# Patient Record
Sex: MALE | Race: WHITE | NOT HISPANIC OR LATINO | Employment: UNEMPLOYED | URBAN - METROPOLITAN AREA
[De-identification: names, ages, dates, MRNs, and addresses within clinical notes are randomized per-mention and may not be internally consistent; named-entity substitution may affect disease eponyms.]

---

## 2023-04-27 ENCOUNTER — OFFICE VISIT (OUTPATIENT)
Dept: INTERNAL MEDICINE | Facility: CLINIC | Age: 38
End: 2023-04-27
Payer: COMMERCIAL

## 2023-04-27 VITALS
HEIGHT: 70 IN | WEIGHT: 134 LBS | DIASTOLIC BLOOD PRESSURE: 70 MMHG | SYSTOLIC BLOOD PRESSURE: 104 MMHG | OXYGEN SATURATION: 99 % | HEART RATE: 80 BPM | BODY MASS INDEX: 19.18 KG/M2 | TEMPERATURE: 99 F

## 2023-04-27 DIAGNOSIS — Z91.89 ENCOUNTER FOR HEPATITIS C VIRUS SCREENING TEST FOR HIGH RISK PATIENT: ICD-10-CM

## 2023-04-27 DIAGNOSIS — Z11.59 ENCOUNTER FOR HEPATITIS C VIRUS SCREENING TEST FOR HIGH RISK PATIENT: ICD-10-CM

## 2023-04-27 DIAGNOSIS — Z13.0 SCREENING FOR DEFICIENCY ANEMIA: ICD-10-CM

## 2023-04-27 DIAGNOSIS — F95.2 TOURETTE'S: ICD-10-CM

## 2023-04-27 DIAGNOSIS — Z13.21 ENCOUNTER FOR VITAMIN DEFICIENCY SCREENING: ICD-10-CM

## 2023-04-27 DIAGNOSIS — N40.0 ENLARGED PROSTATE: ICD-10-CM

## 2023-04-27 DIAGNOSIS — F90.9 ATTENTION DEFICIT HYPERACTIVITY DISORDER (ADHD), UNSPECIFIED ADHD TYPE: ICD-10-CM

## 2023-04-27 DIAGNOSIS — F19.11 HX OF SUBSTANCE ABUSE: ICD-10-CM

## 2023-04-27 DIAGNOSIS — Z76.89 ENCOUNTER TO ESTABLISH CARE: Primary | ICD-10-CM

## 2023-04-27 DIAGNOSIS — Z12.5 SPECIAL SCREENING FOR MALIGNANT NEOPLASM OF PROSTATE: ICD-10-CM

## 2023-04-27 DIAGNOSIS — G25.81 RLS (RESTLESS LEGS SYNDROME): ICD-10-CM

## 2023-04-27 DIAGNOSIS — Z72.0 NICOTINE VAPOR PRODUCT USER: ICD-10-CM

## 2023-04-27 DIAGNOSIS — Z13.220 LIPID SCREENING: ICD-10-CM

## 2023-04-27 DIAGNOSIS — Z13.29 SCREENING FOR ENDOCRINE DISORDER: ICD-10-CM

## 2023-04-27 RX ORDER — NICOTINE 21 MG/24HR
1 PATCH, TRANSDERMAL 24 HOURS TRANSDERMAL EVERY 24 HOURS
Qty: 14 PATCH | Refills: 2 | Status: SHIPPED | OUTPATIENT
Start: 2023-04-27

## 2023-04-27 RX ORDER — GABAPENTIN 300 MG/1
1 CAPSULE ORAL 3 TIMES DAILY
COMMUNITY
Start: 2023-04-20

## 2023-04-27 RX ORDER — BUPRENORPHINE AND NALOXONE 8; 2 MG/1; MG/1
1.75 FILM, SOLUBLE BUCCAL; SUBLINGUAL DAILY
COMMUNITY

## 2023-04-27 NOTE — PROGRESS NOTES
Office Note     Name: Bryan Kirkpatrick    : 1985     MRN: [id number]   Care Team: Patient Care Team:  Indigo Lizama APRN as PCP - General (Family Medicine)  Provider, No Known as PCP - Family Medicine    Chief Complaint  Establish Care    Subjective     History of Present Illness:  Bryan Kirkpatrick is a 37 y.o. male who presents today to establish care.    Mr. Kirkpatrick has a medical history of exercise-induced asthma, Tourette's, ADHD, restless leg syndrome, and history of substance abuse.    Mr. Kirkpatrick describes his health as overall well.  He has always had to eat a significant amount of calories to maintain his weight.  He eats large meals and drinks protein shakes.  He does not exercise.    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  He is engaged to his fiancée who he has been with for 6 years.  He lives in a home in [facility] [place].    Mr. Kirkpatrick began his drug use in the seventh grade, injecting heroin by ninth grade, and this continued for 19 years.  He stopped all drug use at the age of 31.  Around 3-4 months ago, his father passed and he got placed on Suboxone due to his mental health at the time.  He is still currently on Suboxone however, he feels his mental health is in a good place and is planning to stop taking it soon.    He has a history of cigarette smoking however he quit smoking 7 years ago as well.  He has been using a nicotine vape.  With the loss of his father's vape use increased significantly.  He has been using a nicotine patch which is helpful however he feels he has tried to decrease the dose too quickly.  He is requesting nicotine patch at 21 mg.    Mr. Kirkpatrick reports a history of exercise-induced asthma, mostly as a kid.  Although he does not currently exercise, he feels he can get short of breath at times.  Describing this as an inability to fully expand his lungs.  States that it happens randomly.    He complains of  "occasional constipation.  States that this is due to the Suboxone.  He does not take anything for it, states that it somewhat resolves on its own.    Mr. Kirkpatrick is in school for behavioral therapy which he utilizes techniques to manage anxiety and depression.  States that he is aware of triggers from previous trauma and uses his perception and behavior strategies to cope.  States that he is \"happy.\"  Expresses a great deal of pride in where he has come over the past 7 years and all that he is accomplished.    He also states that there are several areas on his lower extremities where the hair is thinning over the past several years.  He also notes this on the crown of his head and a small patch on his scrotum.    Review of Systems   Respiratory: Positive for shortness of breath.    Gastrointestinal: Positive for constipation.   Psychiatric/Behavioral: Negative for suicidal ideas. The patient is hyperactive.    All other systems reviewed and are negative.      Past Medical History:   Diagnosis Date   • Headache    • Tourette disorder    • Tourette syndrome        Past Surgical History:   Procedure Laterality Date   • MOUTH SURGERY         Social History     Socioeconomic History   • Marital status: Single   Tobacco Use   • Smoking status: Former     Packs/day: 1.00     Years: 15.00     Pack years: 15.00     Types: Cigarettes     Quit date:      Years since quittin.3   • Smokeless tobacco: Never   Vaping Use   • Vaping Use: Every day   • Start date: 2016   • Substances: Nicotine, Flavoring   • Devices: Pre-filled or refillable cartridge, Refillable tank   Substance and Sexual Activity   • Alcohol use: No   • Drug use: Not Currently     Types: Heroin     Comment: patient denies, pinpoint pupils  Quit 17         Current Outpatient Medications:   •  buprenorphine-naloxone (Suboxone) 8-2 MG film film, Place 1.75 films under the tongue Daily., Disp: , Rfl:   •  gabapentin (NEURONTIN) 300 MG " "capsule, Take 1 capsule by mouth 3 (Three) Times a Day., Disp: , Rfl:   •  nicotine (NICODERM CQ) 21 MG/24HR patch, Place 1 patch on the skin as directed by provider Daily., Disp: 14 patch, Rfl: 2    Procedures    PHQ-9 Total Score:      Objective     Vital Signs  /70 (BP Location: Left arm, Patient Position: Sitting, Cuff Size: Adult)   Pulse 80   Temp 99 °F (37.2 °C) (Temporal)   Ht 179 cm (70.47\")   Wt 60.8 kg (134 lb)   SpO2 99%   BMI 18.97 kg/m²   Estimated body mass index is 18.97 kg/m² as calculated from the following:    Height as of this encounter: 179 cm (70.47\").    Weight as of this encounter: 60.8 kg (134 lb).    BMI is within normal parameters. No other follow-up for BMI required.      Physical Exam  Vitals and nursing note reviewed.   HENT:      Head: Normocephalic.   Cardiovascular:      Rate and Rhythm: Normal rate.   Pulmonary:      Effort: Pulmonary effort is normal.      Breath sounds: Normal breath sounds.   Skin:     General: Skin is warm and dry.   Neurological:      Mental Status: He is alert.          Assessment and Plan     Assessment/Plan:  Diagnoses and all orders for this visit:    1. Encounter to establish care (Primary)  -     CBC & Differential; Future  -     Comprehensive Metabolic Panel; Future    2. Attention deficit hyperactivity disorder (ADHD), unspecified ADHD type    3. Tourette's    4. RLS (restless legs syndrome)    -Continue gabapentin 300 mg 3 times daily.    5. Hx of substance abuse  -Continue Suboxone therapy.    6. Enlarged prostate  -     PSA Screen; Future    7. Encounter for hepatitis C virus screening test for high risk patient  -     Hepatitis C Antibody; Future    8. Screening for deficiency anemia  -     CBC & Differential; Future    9. Encounter for vitamin deficiency screening  -     Vitamin D,25-Hydroxy; Future  -     Vitamin B12; Future    10. Lipid screening  -     Lipid Panel; Future    11. Screening for endocrine disorder  -     TSH Rfx On " Abnormal To Free T4; Future    12. Nicotine vapor product user  -     nicotine (NICODERM CQ) 21 MG/24HR patch; Place 1 patch on the skin as directed by provider Daily.  Dispense: 14 patch; Refill: 2  -Encouraged to refrain from vape use while using the patch.    13. Special screening for malignant neoplasm of prostate  -     PSA Screen; Future    -Discussed use of therapy techniques to manage depression.  Denies any suicidal thoughts or plan.    There are no Patient Instructions on file for this visit.  Plan of care reviewed with patient at the conclusion of today's visit. Education was provided regarding diagnosis, management and any prescribed or recommended OTC medications.  Patient verbalizes understanding of and agreement with management plan.    Follow Up  Return in about 3 months (around 7/27/2023) for Annual Physical next visit.    Indigo Lizama, APRN

## 2023-05-31 ENCOUNTER — LAB (OUTPATIENT)
Dept: LAB | Facility: HOSPITAL | Age: 38
End: 2023-05-31

## 2023-05-31 DIAGNOSIS — Z91.89 ENCOUNTER FOR HEPATITIS C VIRUS SCREENING TEST FOR HIGH RISK PATIENT: ICD-10-CM

## 2023-05-31 DIAGNOSIS — Z11.59 ENCOUNTER FOR HEPATITIS C VIRUS SCREENING TEST FOR HIGH RISK PATIENT: ICD-10-CM

## 2023-05-31 DIAGNOSIS — Z13.21 ENCOUNTER FOR VITAMIN DEFICIENCY SCREENING: ICD-10-CM

## 2023-05-31 DIAGNOSIS — Z13.220 LIPID SCREENING: ICD-10-CM

## 2023-05-31 DIAGNOSIS — Z76.89 ENCOUNTER TO ESTABLISH CARE: ICD-10-CM

## 2023-05-31 DIAGNOSIS — Z13.29 SCREENING FOR ENDOCRINE DISORDER: ICD-10-CM

## 2023-05-31 DIAGNOSIS — Z13.0 SCREENING FOR DEFICIENCY ANEMIA: ICD-10-CM

## 2023-05-31 DIAGNOSIS — Z12.5 SPECIAL SCREENING FOR MALIGNANT NEOPLASM OF PROSTATE: ICD-10-CM

## 2023-05-31 DIAGNOSIS — N40.0 ENLARGED PROSTATE: ICD-10-CM

## 2023-05-31 LAB
ALBUMIN SERPL-MCNC: 4.4 G/DL (ref 3.5–5.2)
ALBUMIN/GLOB SERPL: 1.7 G/DL
ALP SERPL-CCNC: 49 U/L (ref 39–117)
ALT SERPL W P-5'-P-CCNC: 15 U/L (ref 1–41)
ANION GAP SERPL CALCULATED.3IONS-SCNC: 10.2 MMOL/L (ref 5–15)
AST SERPL-CCNC: 24 U/L (ref 1–40)
BASOPHILS # BLD AUTO: 0.07 10*3/MM3 (ref 0–0.2)
BASOPHILS NFR BLD AUTO: 1.4 % (ref 0–1.5)
BILIRUB SERPL-MCNC: 0.6 MG/DL (ref 0–1.2)
BUN SERPL-MCNC: 13 MG/DL (ref 6–20)
BUN/CREAT SERPL: 13.5 (ref 7–25)
CALCIUM SPEC-SCNC: 9.6 MG/DL (ref 8.6–10.5)
CHLORIDE SERPL-SCNC: 105 MMOL/L (ref 98–107)
CHOLEST SERPL-MCNC: 188 MG/DL (ref 0–200)
CO2 SERPL-SCNC: 26.8 MMOL/L (ref 22–29)
CREAT SERPL-MCNC: 0.96 MG/DL (ref 0.76–1.27)
DEPRECATED RDW RBC AUTO: 38.8 FL (ref 37–54)
EGFRCR SERPLBLD CKD-EPI 2021: 104.4 ML/MIN/1.73
EOSINOPHIL # BLD AUTO: 0.18 10*3/MM3 (ref 0–0.4)
EOSINOPHIL NFR BLD AUTO: 3.6 % (ref 0.3–6.2)
ERYTHROCYTE [DISTWIDTH] IN BLOOD BY AUTOMATED COUNT: 11.9 % (ref 12.3–15.4)
GLOBULIN UR ELPH-MCNC: 2.6 GM/DL
GLUCOSE SERPL-MCNC: 95 MG/DL (ref 65–99)
HCT VFR BLD AUTO: 40.6 % (ref 37.5–51)
HDLC SERPL-MCNC: 49 MG/DL (ref 40–60)
HGB BLD-MCNC: 14 G/DL (ref 13–17.7)
IMM GRANULOCYTES # BLD AUTO: 0.01 10*3/MM3 (ref 0–0.05)
IMM GRANULOCYTES NFR BLD AUTO: 0.2 % (ref 0–0.5)
LDLC SERPL CALC-MCNC: 118 MG/DL (ref 0–100)
LDLC/HDLC SERPL: 2.36 {RATIO}
LYMPHOCYTES # BLD AUTO: 1.83 10*3/MM3 (ref 0.7–3.1)
LYMPHOCYTES NFR BLD AUTO: 36.7 % (ref 19.6–45.3)
MCH RBC QN AUTO: 30.7 PG (ref 26.6–33)
MCHC RBC AUTO-ENTMCNC: 34.5 G/DL (ref 31.5–35.7)
MCV RBC AUTO: 89 FL (ref 79–97)
MONOCYTES # BLD AUTO: 0.66 10*3/MM3 (ref 0.1–0.9)
MONOCYTES NFR BLD AUTO: 13.3 % (ref 5–12)
NEUTROPHILS NFR BLD AUTO: 2.23 10*3/MM3 (ref 1.7–7)
NEUTROPHILS NFR BLD AUTO: 44.8 % (ref 42.7–76)
NRBC BLD AUTO-RTO: 0 /100 WBC (ref 0–0.2)
PLATELET # BLD AUTO: 222 10*3/MM3 (ref 140–450)
PMV BLD AUTO: 10.2 FL (ref 6–12)
POTASSIUM SERPL-SCNC: 4 MMOL/L (ref 3.5–5.2)
PROT SERPL-MCNC: 7 G/DL (ref 6–8.5)
RBC # BLD AUTO: 4.56 10*6/MM3 (ref 4.14–5.8)
SODIUM SERPL-SCNC: 142 MMOL/L (ref 136–145)
TRIGL SERPL-MCNC: 117 MG/DL (ref 0–150)
VLDLC SERPL-MCNC: 21 MG/DL (ref 5–40)
WBC NRBC COR # BLD: 4.98 10*3/MM3 (ref 3.4–10.8)

## 2023-05-31 PROCEDURE — 80050 GENERAL HEALTH PANEL: CPT

## 2023-05-31 PROCEDURE — 80061 LIPID PANEL: CPT

## 2023-05-31 PROCEDURE — 82306 VITAMIN D 25 HYDROXY: CPT

## 2023-05-31 PROCEDURE — 82607 VITAMIN B-12: CPT

## 2023-05-31 PROCEDURE — G0103 PSA SCREENING: HCPCS

## 2023-05-31 PROCEDURE — 86803 HEPATITIS C AB TEST: CPT

## 2023-06-01 DIAGNOSIS — E55.9 VITAMIN D DEFICIENCY: Primary | ICD-10-CM

## 2023-06-01 PROBLEM — R76.8 HEPATITIS C ANTIBODY POSITIVE IN BLOOD: Status: ACTIVE | Noted: 2023-06-01

## 2023-06-01 LAB
25(OH)D3 SERPL-MCNC: 18.6 NG/ML (ref 30–100)
HCV AB SER DONR QL: REACTIVE
PSA SERPL-MCNC: 0.4 NG/ML (ref 0–4)
TSH SERPL DL<=0.05 MIU/L-ACNC: 2.44 UIU/ML (ref 0.27–4.2)
VIT B12 BLD-MCNC: 596 PG/ML (ref 211–946)

## 2023-06-01 RX ORDER — ERGOCALCIFEROL 1.25 MG/1
50000 CAPSULE ORAL WEEKLY
Qty: 5 CAPSULE | Refills: 2 | Status: SHIPPED | OUTPATIENT
Start: 2023-06-01

## 2023-07-27 ENCOUNTER — OFFICE VISIT (OUTPATIENT)
Dept: INTERNAL MEDICINE | Facility: CLINIC | Age: 38
End: 2023-07-27
Payer: COMMERCIAL

## 2023-07-27 VITALS
OXYGEN SATURATION: 97 % | TEMPERATURE: 99.3 F | HEART RATE: 82 BPM | WEIGHT: 142.4 LBS | BODY MASS INDEX: 20.39 KG/M2 | SYSTOLIC BLOOD PRESSURE: 136 MMHG | HEIGHT: 70 IN | DIASTOLIC BLOOD PRESSURE: 68 MMHG

## 2023-07-27 DIAGNOSIS — J34.9 SINUS DISORDER: ICD-10-CM

## 2023-07-27 DIAGNOSIS — F95.2 TOURETTE'S: ICD-10-CM

## 2023-07-27 DIAGNOSIS — Z00.00 ANNUAL PHYSICAL EXAM: Primary | ICD-10-CM

## 2023-07-27 DIAGNOSIS — F19.11 HX OF SUBSTANCE ABUSE: ICD-10-CM

## 2023-07-27 DIAGNOSIS — G25.81 RLS (RESTLESS LEGS SYNDROME): ICD-10-CM

## 2023-07-27 DIAGNOSIS — F90.9 ATTENTION DEFICIT HYPERACTIVITY DISORDER (ADHD), UNSPECIFIED ADHD TYPE: ICD-10-CM

## 2023-07-27 DIAGNOSIS — R76.8 HEPATITIS C ANTIBODY POSITIVE IN BLOOD: ICD-10-CM

## 2023-07-27 DIAGNOSIS — R26.81 UNSTEADY: ICD-10-CM

## 2023-07-27 RX ORDER — GABAPENTIN 600 MG/1
600 TABLET ORAL 3 TIMES DAILY
COMMUNITY

## 2023-07-27 NOTE — PROGRESS NOTES
"    Male Physical Note      Name: Bryan Kirkpatrick    : 1985     MRN: [id number]   Care Team: Patient Care Team:  Indigo Lizama APRN as PCP - General (Family Medicine)  Provider, No Known as PCP - Family Medicine    Chief Complaint  Annual Exam, Nose Pain (Pt notes injuries in his nasal sinus, and fluid that builds up when bending over), and Sense of Self (Pt wanted to speak on his sense of awareness during certain movements)    Subjective     History of Present Illness:  Bryan Kirkpatrick is a 37 y.o. male who presents today for an annual exam.    The patient is being seen for a health maintenance evaluation.    Mr. Kirkpatrick has a history of Tourette's, ADHD, substance abuse.    He states that his health is overall well.  He attempts to eat a healthy well-balanced diet however, is attempting to gain weight so he is increasing his calories.    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  He is  with a daughter.    He states that he is currently receiving Suboxone daily after recently decreasing his dose related to wanting to no longer take it.  He is on gabapentin 300 mg 3 times a day and states that this helps with the withdrawal symptoms.  He denies current drug use.    He denies current alcohol use.  He states that he is no longer smoking cigarettes however, he is using tobacco pouches.  He is not interested in cessation at this time related to increased stress with going to school, his job, life.    He states that he is currently experiencing \"losing himself in space\" when he attempts to do a handstand as he previously spent many years in gymnastics.  He also complains of burning in his nose when he utilizes a cinnamon flavored tobacco pouch if he is in any position other than setting up right.  He states that he feels there could be a hole from his years of substance abuse and would like to see an ENT.      Past Medical History: " "  Diagnosis Date    Headache     Tourette disorder     Tourette syndrome      Past Surgical History:   Procedure Laterality Date    MOUTH SURGERY       Family History   Problem Relation Age of Onset    Heart attack Father     Migraine headaches Father     Heart disease Sister     Cancer Maternal Aunt     Diabetes Cousin      Social History     Tobacco Use   Smoking Status Former    Packs/day: 1.00    Years: 15.00    Pack years: 15.00    Types: Cigarettes    Quit date:     Years since quittin.5   Smokeless Tobacco Current    Types: Chew   Tobacco Comments    Cinnamon nicotine chews     No Known Allergies    Current Outpatient Medications:     buprenorphine-naloxone (SUBOXONE) 8-2 MG film film, Place 1 film under the tongue Daily., Disp: , Rfl:     gabapentin (NEURONTIN) 600 MG tablet, Take 1 tablet by mouth 3 (Three) Times a Day., Disp: , Rfl:     vitamin D (ERGOCALCIFEROL) 1.25 MG (74234 UT) capsule capsule, Take 1 capsule by mouth 1 (One) Time Per Week., Disp: 5 capsule, Rfl: 2    Review of systems was completed, and pertinent findings are noted in the HPI.  Review of Systems   Respiratory:  Positive for shortness of breath.    Neurological:         Unsteady   All other systems reviewed and are negative.     General History  Bryan  does not have regular dental visits.  He does not complain of vision problems. Last eye exam was   Immunizations are not up to date. The patient needs the following immunizations: COVID and Tdap    Lifestyle  Bryan  consumes a in general, a \"healthy\" diet  .  He exercises never.    Reproductive Health  Bryan  is sexually active. His contraceptive plan is no method.   He does not have erectile dysfunction.     Screening  Last PSA was .    Last colonoscopy was none  Last Completed Colonoscopy       This patient has no relevant Health Maintenance data.        . Family history of colon cancer: None  Other pertinent family history and/or screenings: None    PHQ-9 " "Total Score:      [unfilled]    Objective     Vital Signs  /68 (BP Location: Left arm, Patient Position: Sitting, Cuff Size: Adult)   Pulse 82   Temp 99.3 °F (37.4 °C) (Infrared)   Ht 179 cm (70.47\")   Wt 64.6 kg (142 lb 6.4 oz)   SpO2 97%   BMI 20.16 kg/m²   Estimated body mass index is 20.16 kg/m² as calculated from the following:    Height as of this encounter: 179 cm (70.47\").    Weight as of this encounter: 64.6 kg (142 lb 6.4 oz).    BMI is within normal parameters. No other follow-up for BMI required.      Physical Exam  Vitals and nursing note reviewed.   HENT:      Head: Normocephalic.      Right Ear: Tympanic membrane normal.      Left Ear: Tympanic membrane normal.      Mouth/Throat:      Mouth: Mucous membranes are moist.      Pharynx: Oropharynx is clear.   Eyes:      Pupils: Pupils are equal, round, and reactive to light.   Cardiovascular:      Rate and Rhythm: Normal rate and regular rhythm.   Pulmonary:      Effort: Pulmonary effort is normal.      Breath sounds: Normal breath sounds.   Abdominal:      General: Bowel sounds are normal.      Palpations: Abdomen is soft.   Musculoskeletal:         General: Normal range of motion.      Cervical back: Normal range of motion and neck supple.   Skin:     General: Skin is warm and dry.          Neurological:      General: No focal deficit present.      Mental Status: He is alert and oriented to person, place, and time.   Psychiatric:         Mood and Affect: Mood normal.         Behavior: Behavior normal.         Thought Content: Thought content normal.         Judgment: Judgment normal.        Assessment and Plan     Diagnoses and all orders for this visit:    1. Annual physical exam (Primary)  Comments:  Labs obtained and reviewed.  -Discussed and encouraged patient to continue to consume an overall healthy well-balanced diet.  -Antonella and encouraged patient to continue to consume adequate water intake.    2. Hx of substance " abuse  Comments:  Continue Suboxone daily.  -Mr. Kirkpatrick denied current drug use, Suboxone use daily.  Encouraged to continue on his journey with sobriety.    3. Unsteady  Comments:  Referral to ENT.  Orders:  -     Ambulatory Referral to ENT (Otolaryngology)    4. Sinus disorder  -     Ambulatory Referral to ENT (Otolaryngology)    5. Attention deficit hyperactivity disorder (ADHD), unspecified ADHD type    6. Hepatitis C antibody positive in blood    7. RLS (restless legs syndrome)    8. Tourette's        There are no Patient Instructions on file for this visit.    Counseling/Anticipatory Guidance:   Plan of care reviewed with patient at the conclusion of today's visit. Education was provided in regards to diagnosis, diet and exercise, prostate cancer screening discussed including benefit of early detection, potential need for follow-up, and harms associated with additional management. Patient agrees to screening.    Nutrition, physical activity, healthy weight,ways to reduce stress, adequate sleep, injury prevention, misuse of tobacco, alcohol and drugs, sexual behavior and STD's, dental health, mental health, and immunizations.    Plan of care reviewed with patient at the conclusion of today's visit. Education was provided regarding diagnosis, management and any prescribed or recommended OTC medications.  Patient verbalizes understanding of and agreement with management plan.      Return in about 1 year (around 7/27/2024) for Annual Physical next visit.    Indigo Lizama, APRN

## 2023-11-20 ENCOUNTER — OFFICE VISIT (OUTPATIENT)
Dept: INTERNAL MEDICINE | Facility: CLINIC | Age: 38
End: 2023-11-20
Payer: COMMERCIAL

## 2023-11-20 VITALS
DIASTOLIC BLOOD PRESSURE: 74 MMHG | BODY MASS INDEX: 20.27 KG/M2 | HEIGHT: 70 IN | TEMPERATURE: 98.5 F | OXYGEN SATURATION: 98 % | HEART RATE: 96 BPM | WEIGHT: 141.6 LBS | SYSTOLIC BLOOD PRESSURE: 110 MMHG

## 2023-11-20 DIAGNOSIS — N50.89 MASS OF LEFT TESTICLE: ICD-10-CM

## 2023-11-20 DIAGNOSIS — N45.1 ACUTE EPIDIDYMITIS: Primary | ICD-10-CM

## 2023-11-20 PROCEDURE — 99213 OFFICE O/P EST LOW 20 MIN: CPT

## 2023-11-20 RX ORDER — DOXYCYCLINE HYCLATE 100 MG/1
100 CAPSULE ORAL 2 TIMES DAILY
Qty: 20 CAPSULE | Refills: 0 | Status: SHIPPED | OUTPATIENT
Start: 2023-11-20 | End: 2023-11-30

## 2023-11-20 NOTE — PROGRESS NOTES
"    Office Note     Name: Bryan Kirkpatrick    : 1985     MRN: [id number]   Care Team: Patient Care Team:  Indigo Lizama APRN as PCP - General (Family Medicine)  Provider, No Known as PCP - Family Medicine    Chief Complaint  Painful knot on testicle (L, first noticed about 5 days ago, painful with touch, feels it in his stomach like he got kicked in his testicle)    Subjective     History of Present Illness:  The patient is a 38-year-old male who presents for same day acute visit.    The patient is experiencing pain in his left testicle that began approximately about 5 days ago. He does not experience pain until he touches the inside of his left testicle which causes severe pain in his lower abdomen. He noticed a \"little ball\" on the inside of his left testicle. He denies any erythema or swelling. When he pushes on the inside of his testicle, it feels like he got kicked in the testicles. He denies any discomfort if he does not touch the testicle. He denies any drainage or pruritus. Occasionally, he will have a burning sensation with intercourse. He suspects that it may be a yeast infection. It will burn for a day and then resolve. He will be fine for approximately 1.5 months , and then it happens again, but it has been months since this has occurred.  This has happenening since he was younger.     He has big spots of hair that are disappearing on his lower extremities. It used to be a lot harrier. He denies smoking cigarettes. He uses tobacco pouches. He denies any pain in his feet or calves. He used to experience cramps in his lower extremities 1 to 2 times weekly when he was sick. Ever since he was put on gabapentin 800 mg 3 times a day, due to weaning off Suboxone, he does not experience lower extremity cramps. With gabapentin, his mental behavior is a lot calmer. It is more of a mood stabilizer for him. He does not want to take Xanax. SSRIs gave him hallucinations. He has cold hands and feet.    He " has a white area on his head that has lost all pigment. He denies any change in pigment anywhere else. The patient is concerned that he might have an STD; however, he is in a monogamous relationship. He denies any discharge or pruritus. He has not noticed any foul odor or discharge. He has intercourse once every 5 days.      Past Medical History:   Diagnosis Date    Headache     Tourette disorder     Tourette syndrome        Past Surgical History:   Procedure Laterality Date    MOUTH SURGERY         Social History     Socioeconomic History    Marital status: Single   Tobacco Use    Smoking status: Former     Packs/day: 1.00     Years: 15.00     Additional pack years: 0.00     Total pack years: 15.00     Types: Cigarettes     Quit date:      Years since quittin.8    Smokeless tobacco: Current     Types: Chew    Tobacco comments:     Cinnamon nicotine chews   Vaping Use    Vaping Use: Every day    Start date: 2016    Substances: Nicotine, Flavoring    Devices: Pre-filled or refillable cartridge, Refillable tank   Substance and Sexual Activity    Alcohol use: No    Drug use: Not Currently     Types: Heroin     Comment: patient denies, pinpoint pupils  Quit 17    Sexual activity: Defer         Current Outpatient Medications:     buprenorphine-naloxone (SUBOXONE) 8-2 MG film film, Place 1 film under the tongue Daily., Disp: , Rfl:     gabapentin (NEURONTIN) 600 MG tablet, Take 1 tablet by mouth 3 (Three) Times a Day., Disp: , Rfl:     vitamin D (ERGOCALCIFEROL) 1.25 MG (16121 UT) capsule capsule, Take 1 capsule by mouth 1 (One) Time Per Week., Disp: 5 capsule, Rfl: 2    doxycycline (VIBRAMYCIN) 100 MG capsule, Take 1 capsule by mouth 2 (Two) Times a Day for 10 days., Disp: 20 capsule, Rfl: 0    Procedures    PHQ-9 Total Score:      Objective     Vital Signs  /74 (BP Location: Right arm, Patient Position: Sitting, Cuff Size: Adult)   Pulse 96   Temp 98.5 °F (36.9 °C) (Infrared)   Ht 179  "cm (70.47\")   Wt 64.2 kg (141 lb 9.6 oz)   SpO2 98%   BMI 20.05 kg/m²   Estimated body mass index is 20.05 kg/m² as calculated from the following:    Height as of this encounter: 179 cm (70.47\").    Weight as of this encounter: 64.2 kg (141 lb 9.6 oz).    BMI is within normal parameters. No other follow-up for BMI required.      Physical Exam  Vitals and nursing note reviewed.   HENT:      Head: Normocephalic.   Cardiovascular:      Rate and Rhythm: Normal rate.   Pulmonary:      Effort: Pulmonary effort is normal.      Breath sounds: Normal breath sounds.   Skin:     General: Skin is warm and dry.   Neurological:      General: No focal deficit present.      Mental Status: He is alert and oriented to person, place, and time.   Psychiatric:         Mood and Affect: Mood normal.         Behavior: Behavior normal.         Thought Content: Thought content normal.         Judgment: Judgment normal.            Assessment and Plan     Assessment/Plan:  Diagnoses and all orders for this visit:    1. Acute epididymitis (Primary)  -     doxycycline (VIBRAMYCIN) 100 MG capsule; Take 1 capsule by mouth 2 (Two) Times a Day for 10 days.  Dispense: 20 capsule; Refill: 0    2. Mass of left testicle  -     US Scrotum & Testicles; Future  I will order an ultrasound. I will prescribe him antibiotics for 10 days. If there are any concerns, we will send him to a urologist.  -Denies concerns for STIs.    3. Hair loss on lower extremities  This could be vascular insufficiency. If he starts to develop erythema, shiny legs, pain in his lower extremities, or rashes, we will refer him to a vascular specialist. I advised him to avoid nicotene, eat a healthy diet, and maintain a good active lifestyle. I will keep an eye on his labs.     There are no Patient Instructions on file for this visit.    Plan of care reviewed with patient at the conclusion of today's visit. Education was provided regarding diagnosis, management and any prescribed or " recommended OTC medications.  Patient verbalizes understanding of and agreement with management plan.    Follow Up  Return for Next Scheduled Follow up.    FRANCESCO Waldrop     Transcribed from ambient dictation for FRANCESCO Waldrop by Anusha Saenz.  11/20/23   18:40 EST    Patient or patient representative verbalized consent to the visit recording.  I have personally performed the services described in this document as transcribed by the above individual, and it is both accurate and complete.

## 2024-03-18 ENCOUNTER — OFFICE VISIT (OUTPATIENT)
Dept: INTERNAL MEDICINE | Facility: CLINIC | Age: 39
End: 2024-03-18
Payer: COMMERCIAL

## 2024-03-18 VITALS
TEMPERATURE: 99.1 F | SYSTOLIC BLOOD PRESSURE: 128 MMHG | BODY MASS INDEX: 20.76 KG/M2 | HEART RATE: 84 BPM | DIASTOLIC BLOOD PRESSURE: 78 MMHG | WEIGHT: 145 LBS | HEIGHT: 70 IN

## 2024-03-18 DIAGNOSIS — J01.00 ACUTE NON-RECURRENT MAXILLARY SINUSITIS: Primary | ICD-10-CM

## 2024-03-18 DIAGNOSIS — J34.2 DEVIATED SEPTUM: ICD-10-CM

## 2024-03-18 PROCEDURE — 99214 OFFICE O/P EST MOD 30 MIN: CPT

## 2024-03-18 RX ORDER — AZITHROMYCIN 250 MG/1
TABLET, FILM COATED ORAL
Qty: 6 TABLET | Refills: 0 | Status: SHIPPED | OUTPATIENT
Start: 2024-03-18

## 2024-03-18 RX ORDER — GABAPENTIN 800 MG/1
800 TABLET ORAL 3 TIMES DAILY
COMMUNITY
Start: 2024-03-11

## 2024-03-18 NOTE — LETTER
March 18, 2024     Patient: Bryan Kirkpatrick   YOB: 1985   Date of Visit: 3/18/2024       To Whom It May Concern:    It is my medical opinion that Bryan Kirkpatrick be excused from work today 3/18/2024.            Sincerely,        FRANCESCO Waldrop    CC: No Recipients

## 2024-03-18 NOTE — PROGRESS NOTES
Acute Office Visit      Name: Bryan Kirkpatrick    : 1985     MRN: [id number]   Care Team: Patient Care Team:  Indigo Lizama APRN as PCP - General (Family Medicine)  Provider, No Known as PCP - Family Medicine    Chief Complaint  Sinus Problem (Patient has been sick for 20 days /Sinus pain/pressure in the left side of face behind his eye, left ear pain, and left upper jaw line. ), Fatigue (Extreme fatigue ), and Weight Loss (Patient has lost about 20 lbs since being sicl )    Subjective     History of Present Illness:  The patient is a 38-year-old male who presents for evaluation of multiple medical concerns.    Upper respiratory symptoms.  He worked at the [employer]. He got sick after working there for about 2 weeks. He was in bed for 9 days. He had chest congestion and a productive cough of dark green, tan phlegm that was thick and would come up in pieces as he coughed. After about 4 days, he was sluggish to the point where he felt better going to work. He went back to work for 3 days. He then woke up in the middle of the night vomiting and had fevers of 101.1 degrees and 101.7 degrees. That was about 5 days ago. His fever resolved on 2024. He has been sick for the last 20 days and has lost approximately 20 pounds. He feels like he is being stabbed in his jaw, and he has otalgia. It flares up when he holds his head in a certain position or lies down for a while. It is not bad when he sits straight up and has been sleeping sitting straight up for the last 2 nights. He has dyspnea. He denies chest pain. He feels like his illness is almost resolved. Deep breathing makes his ear hurt. He has been taking ibuprofen, Theraflu, and acetaminophen. He still has nasal congestion. He has a headache on the left side. 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 He snorted illicit drugs for the first 4 years in his drug addiction.      Review of Systems:    Past Medical History:   Diagnosis Date  "   Headache     Tourette disorder     Tourette syndrome        Past Surgical History:   Procedure Laterality Date    MOUTH SURGERY         Social History     Socioeconomic History    Marital status: Single   Tobacco Use    Smoking status: Former     Current packs/day: 0.00     Average packs/day: 1 pack/day for 15.0 years (15.0 ttl pk-yrs)     Types: Cigarettes     Start date:      Quit date:      Years since quittin.2    Smokeless tobacco: Current     Types: Chew    Tobacco comments:     Cinnamon nicotine chews   Vaping Use    Vaping status: Every Day    Start date: 2016    Substances: Nicotine, Flavoring    Devices: Pre-filled or refillable cartridge, Refillable tank   Substance and Sexual Activity    Alcohol use: No    Drug use: Not Currently     Types: Heroin     Comment: patient denies, pinpoint pupils  Quit 17    Sexual activity: Defer         Current Outpatient Medications:     buprenorphine-naloxone (SUBOXONE) 8-2 MG film film, Place 0.75-1 films under the tongue Daily., Disp: , Rfl:     gabapentin (NEURONTIN) 800 MG tablet, Take 1 tablet by mouth 3 (Three) Times a Day., Disp: , Rfl:     azithromycin (Zithromax Z-Adelso) 250 MG tablet, Take 2 tablets by mouth on day 1, then 1 tablet daily on days 2-5, Disp: 6 tablet, Rfl: 0    Procedures    PHQ-9 Total Score:      Objective     Vital Signs  /78 (BP Location: Left arm, Patient Position: Sitting, Cuff Size: Adult)   Pulse 84   Temp 99.1 °F (37.3 °C) (Temporal)   Ht 179 cm (70.47\")   Wt 65.8 kg (145 lb)   BMI 20.53 kg/m²   Estimated body mass index is 20.53 kg/m² as calculated from the following:    Height as of this encounter: 179 cm (70.47\").    Weight as of this encounter: 65.8 kg (145 lb).    BMI is within normal parameters. No other follow-up for BMI required.      Physical Exam  Vitals and nursing note reviewed.   HENT:      Head: Normocephalic.      Right Ear: Ear canal and external ear normal. A middle ear effusion " is present.      Left Ear: Ear canal and external ear normal. A middle ear effusion is present.      Nose: Congestion present.      Mouth/Throat:      Mouth: Mucous membranes are moist.      Pharynx: Posterior oropharyngeal erythema present.   Eyes:      Pupils: Pupils are equal, round, and reactive to light.   Cardiovascular:      Rate and Rhythm: Normal rate.   Pulmonary:      Effort: Pulmonary effort is normal.      Breath sounds: Normal breath sounds.   Skin:     General: Skin is warm and dry.   Neurological:      General: No focal deficit present.      Mental Status: He is alert and oriented to person, place, and time.   Psychiatric:         Mood and Affect: Mood normal.         Behavior: Behavior normal.         Thought Content: Thought content normal.         Judgment: Judgment normal.            Assessment and Plan     Assessment/Plan:  Diagnoses and all orders for this visit:    1. Acute non-recurrent maxillary sinusitis (Primary)  -     azithromycin (Zithromax Z-Adelso) 250 MG tablet; Take 2 tablets by mouth on day 1, then 1 tablet daily on days 2-5  Dispense: 6 tablet; Refill: 0  - Samples of nasal sprays and nasal rinse were given to the patient.   - I will prescribe an antibiotic to be taken every day for 5 days.   - He was advised to drink plenty of water.       2. Deviated septum/Possible polyps  -     Ambulatory Referral to ENT (Otolaryngology)  -Multiple years of drug use has caused several concerns to Mr. Isabella randle.  -Unable to visualize concerns related to lack of equipment but states that he constantly feels an irritation/blockage in nasal cavity, more on the left.  -Will refer to ENT for evaluation.    The patient will follow up as needed.    There are no Patient Instructions on file for this visit.  Plan of care reviewed with patient at the conclusion of today's visit. Education was provided regarding diagnosis, management and any prescribed or recommended OTC medications.  Patient verbalizes  understanding of and agreement with management plan.    Follow Up  Return for Next Scheduled Follow up.    FRANCESCO Waldrop     Transcribed from ambient dictation for FRANCESCO Waldrop by Miroslava Quiros.  03/18/24   17:29 EDT    Patient or patient representative verbalized consent to the visit recording.  I have personally performed the services described in this document as transcribed by the above individual, and it is both accurate and complete.

## 2024-03-19 DIAGNOSIS — J34.2 DEVIATED SEPTUM: Primary | ICD-10-CM

## 2024-05-21 ENCOUNTER — OFFICE VISIT (OUTPATIENT)
Dept: INTERNAL MEDICINE | Facility: CLINIC | Age: 39
End: 2024-05-21
Payer: COMMERCIAL

## 2024-05-21 ENCOUNTER — TELEPHONE (OUTPATIENT)
Dept: INTERNAL MEDICINE | Facility: CLINIC | Age: 39
End: 2024-05-21

## 2024-05-21 VITALS
BODY MASS INDEX: 21.62 KG/M2 | HEIGHT: 70 IN | TEMPERATURE: 98.7 F | SYSTOLIC BLOOD PRESSURE: 122 MMHG | DIASTOLIC BLOOD PRESSURE: 72 MMHG | WEIGHT: 151 LBS | HEART RATE: 80 BPM

## 2024-05-21 DIAGNOSIS — R53.83 OTHER FATIGUE: ICD-10-CM

## 2024-05-21 DIAGNOSIS — N50.89 MASS OF LEFT TESTICLE: ICD-10-CM

## 2024-05-21 DIAGNOSIS — I73.9 PAD (PERIPHERAL ARTERY DISEASE): ICD-10-CM

## 2024-05-21 DIAGNOSIS — R68.82 DECREASED LIBIDO WITHOUT SEXUAL DYSFUNCTION: ICD-10-CM

## 2024-05-21 DIAGNOSIS — N50.9 TESTICULAR ABNORMALITY: Primary | ICD-10-CM

## 2024-05-21 DIAGNOSIS — L65.9 HAIR THINNING: ICD-10-CM

## 2024-05-21 PROCEDURE — 99214 OFFICE O/P EST MOD 30 MIN: CPT

## 2024-05-21 NOTE — PROGRESS NOTES
Acute Office Visit      Name: Bryan Kirkpatrick    : 1985     MRN: [id number]   Care Team: Patient Care Team:  Indigo Lizama APRN as PCP - General (Family Medicine)  Provider, No Known as PCP - Family Medicine    Chief Complaint  Mass (Testicle mass)    Subjective     History of Present Illness:  The patient is a 38-year-old male who presents for evaluation of multiple medical concerns.    The patient requires a new referral for an ultrasound of his testicles, citing difficulties in securing an appointment. He reports the presence of small nodules in both testicles, one of which is located at the junction of the tubes and another on the opposite side. He describes a sensation akin to a mesh web on the top.    The patient expresses a desire to have his testosterone levels checked, suspecting a history of low testosterone. He reports a diminished libido, although his sexual performance remains unaffected. He also notes a decrease in energy levels.    The patient reports increased hair loss on his legs and pigmentation, which he first noticed 2 to 3 years ago. He describes a pigmented area that is completely white and has increased in size over the past few years. He denies any associated pain or swelling in his feet or ankles. He also notes a shiny appearance to bilateral legs and has concerns for vascular disease.    He has always had restless legs syndrome for the last 6 to 7 years. He was put on gabapentin which helped. He attributes his restless legs syndrome to 19 years of drug abuse.      Past Medical History:   Diagnosis Date   • Headache    • Tourette disorder    • Tourette syndrome        Past Surgical History:   Procedure Laterality Date   • MOUTH SURGERY         Social History     Socioeconomic History   • Marital status: Single   Tobacco Use   • Smoking status: Former     Current packs/day: 0.00     Average packs/day: 1 pack/day for 15.0 years (15.0 ttl pk-yrs)     Types: Cigarettes  "    Start date:      Quit date: 2016     Years since quittin.3   • Smokeless tobacco: Current     Types: Chew   • Tobacco comments:     Cinnamon nicotine chews   Vaping Use   • Vaping status: Every Day   • Start date: 2016   • Substances: Nicotine, Flavoring   • Devices: Pre-filled or refillable cartridge, Refillable tank   Substance and Sexual Activity   • Alcohol use: No   • Drug use: Not Currently     Types: Heroin     Comment: patient denies, pinpoint pupils  Quit 17   • Sexual activity: Defer         Current Outpatient Medications:   •  buprenorphine-naloxone (SUBOXONE) 8-2 MG film film, Place 0.75-1 films under the tongue Daily., Disp: , Rfl:   •  gabapentin (NEURONTIN) 800 MG tablet, Take 1 tablet by mouth 3 (Three) Times a Day., Disp: , Rfl:     Procedures    PHQ-9 Total Score:      Objective     Vital Signs  /72 (BP Location: Left arm, Patient Position: Sitting, Cuff Size: Adult)   Pulse 80   Temp 98.7 °F (37.1 °C) (Temporal)   Ht 179 cm (70.47\")   Wt 68.5 kg (151 lb)   BMI 21.38 kg/m²   Estimated body mass index is 21.38 kg/m² as calculated from the following:    Height as of this encounter: 179 cm (70.47\").    Weight as of this encounter: 68.5 kg (151 lb).    BMI is within normal parameters. No other follow-up for BMI required.      Physical Exam  Vitals and nursing note reviewed.   HENT:      Head: Normocephalic.   Skin:     General: Skin is warm and dry.   Neurological:      General: No focal deficit present.      Mental Status: He is alert and oriented to person, place, and time.   Psychiatric:         Mood and Affect: Mood normal.         Behavior: Behavior normal.         Thought Content: Thought content normal.         Judgment: Judgment normal.            Assessment and Plan     Assessment/Plan:  Diagnoses and all orders for this visit:    1. Testicular abnormality (Primary)  -     US Scrotum & Testicles; Future  -An ultrasound of the bilateral testicles will be " ordered.   -Additionally, laboratory tests will be ordered to assess his testosterone levels, to be conducted before 10 a.m.   -Should the laboratory results be abnormal, a referral to a urologist will be considered.    2. Mass of left testicle  -     US Scrotum & Testicles; Future    3. Decreased libido without sexual dysfunction  -     Follicle Stimulating Hormone; Future  -     Luteinizing Hormone; Future  -     Prolactin; Future  -     DHEA-Sulfate; Future  -     Testosterone; Future    4. Other fatigue  -     Follicle Stimulating Hormone; Future  -     Luteinizing Hormone; Future  -     Prolactin; Future  -     DHEA-Sulfate; Future  -     Testosterone; Future    5. Hair thinning  -     Cancel: Ambulatory Referral to Vascular Surgery  -     Ambulatory Referral to Vascular Surgery    6. PAD (peripheral artery disease)  -     Ambulatory Referral to Vascular Surgery    There are no Patient Instructions on file for this visit.  Plan of care reviewed with patient at the conclusion of today's visit. Education was provided regarding diagnosis, management and any prescribed or recommended OTC medications.  Patient verbalizes understanding of and agreement with management plan.    Follow Up  Return for Next Scheduled Follow up.    FRANCESCO Waldrop       Transcribed from ambient dictation for FRANCESCO Waldrop by Tamica Avelar.  05/21/24   15:43 EDT    Patient or patient representative verbalized consent to the visit recording.  I have personally performed the services described in this document as transcribed by the above individual, and it is both accurate and complete.

## 2024-08-06 ENCOUNTER — OFFICE VISIT (OUTPATIENT)
Dept: INTERNAL MEDICINE | Facility: CLINIC | Age: 39
End: 2024-08-06
Payer: COMMERCIAL

## 2024-08-06 VITALS
SYSTOLIC BLOOD PRESSURE: 112 MMHG | WEIGHT: 155 LBS | BODY MASS INDEX: 22.19 KG/M2 | TEMPERATURE: 98 F | HEIGHT: 70 IN | HEART RATE: 80 BPM | DIASTOLIC BLOOD PRESSURE: 68 MMHG

## 2024-08-06 DIAGNOSIS — Z00.00 ANNUAL PHYSICAL EXAM: Primary | ICD-10-CM

## 2024-08-06 PROCEDURE — 99395 PREV VISIT EST AGE 18-39: CPT

## 2024-08-06 NOTE — PROGRESS NOTES
Male Physical Note      Name: Bryan Kirkpatrick    : 1985     MRN: [id number]   Care Team: Patient Care Team:  Indigo Lizama APRN as PCP - General (Family Medicine)  Provider, No Known as PCP - Family Medicine    Chief Complaint  Annual Exam    Subjective     History of Present Illness:    Bryan is a pleasant 38 year old male with a past medical history for drug abuse, Tourette's, and ADHD.    Present to office for annual physical. Patient declined the Covid/Flu vaccine/Tdap vaccination. Patient reports that he have false teeth (upper/lower) so he does not get dental visits. Denies having loose-fitting dentures. Denies having soreness to gums. Patient denies headaches, dizziness, chest pain or shortness of breath. Patient expressed not being to take good breaths. Reports next vision appointment is about in about 1 month. Denies upset stomach and diarrhea. Patient expressed the Suboxone causes constipation.     Patient expressed that since being on a lower dose of Suboxone, he has had  an increase in thoughts of suicidal ideation but has no plan. Patient reports seeing Behavioral Health following visit today in office.    The patient is being seen for a health maintenance evaluation.    Past Medical History:   Diagnosis Date    Headache     Tourette disorder     Tourette syndrome      Past Surgical History:   Procedure Laterality Date    MOUTH SURGERY       Family History   Problem Relation Age of Onset    Heart attack Father     Migraine headaches Father     Heart disease Sister     Cancer Maternal Aunt     Diabetes Cousin      Social History     Tobacco Use   Smoking Status Former    Current packs/day: 0.00    Average packs/day: 1 pack/day for 15.0 years (15.0 ttl pk-yrs)    Types: Cigarettes    Start date:     Quit date: 2016    Years since quittin.6   Smokeless Tobacco Current   Tobacco Comments    Cinnamon nicotine chews     No Known Allergies    Current Outpatient Medications:      "buprenorphine-naloxone (SUBOXONE) 8-2 MG film film, Place 0.75-1 films under the tongue Daily., Disp: , Rfl:     gabapentin (NEURONTIN) 800 MG tablet, Take 1 tablet by mouth 3 (Three) Times a Day., Disp: , Rfl:     General History  Bryan  does not have regular dental visits. Have false teeth since 2023.  He does not complain of vision problems. Last eye exam was 1 year ago. Due to be rechecked.  Immunizations are up to date. The patient needs the following immunizations: none    Lifestyle  Bryan  consumes a in general, an \"unhealthy\" diet. Reports when terrance cooks he eat wells.  He exercises daily. Take dog for long walk nightly.    Reproductive Health  Bryan  is sexually active. His contraceptive plan is no method.   He does not have erectile dysfunction.     Screening  Last PSA was : 5.31.23.  Last prostate exam: never. Family history of prostate cancer: none  Last testicular exam was: never.   Last colonoscopy was: never.  Last Completed Colonoscopy       This patient has no relevant Health Maintenance data.        Family history of colon cancer: none  Other pertinent family history and/or screenings: none    PHQ-9 Total Score:        Objective     Vital Signs  /68 (BP Location: Left arm, Patient Position: Sitting, Cuff Size: Adult)   Pulse 80   Temp 98 °F (36.7 °C) (Temporal)   Ht 177 cm (69.69\")   Wt 70.3 kg (155 lb)   BMI 22.44 kg/m²   Estimated body mass index is 22.44 kg/m² as calculated from the following:    Height as of this encounter: 177 cm (69.69\").    Weight as of this encounter: 70.3 kg (155 lb).    BMI is within normal parameters. No other follow-up for BMI required.      Physical Exam  Vitals reviewed.   HENT:      Head: Normocephalic and atraumatic.      Right Ear: Tympanic membrane, ear canal and external ear normal.      Left Ear: Tympanic membrane, ear canal and external ear normal.      Mouth/Throat:      Mouth: Mucous membranes are moist.   Cardiovascular:      Rate and Rhythm: " Normal rate and regular rhythm.      Heart sounds: Normal heart sounds.   Pulmonary:      Effort: Pulmonary effort is normal.      Breath sounds: Normal breath sounds.   Abdominal:      General: Abdomen is flat. Bowel sounds are normal.      Palpations: Abdomen is soft.   Skin:     General: Skin is warm and dry.   Neurological:      Mental Status: He is alert and oriented to person, place, and time.   Psychiatric:         Mood and Affect: Mood normal.         Behavior: Behavior normal.         Thought Content: Thought content normal.         Judgment: Judgment normal.            Assessment and Plan     Diagnoses and all orders for this visit:    1. Annual physical exam (Primary)      -  Patient instructed to continue with taking medication as prescribed.      -      Encouraged patient to increase water intake to 8 glasses a day to help relieve bowel.      -      Patient was given the suicidal hotline number to call  for depression/suicidal ideations.      -      Patient was given number to [facility] to call and set up appointment regarding septal    deviation surgery.  -Denies concerns for sexual health testing, treatment, or access to resources.            Patient Instructions   Health Maintenance, Male  Adopting a healthy lifestyle and getting preventive care are important in promoting health and wellness. Ask your health care provider about:  The right schedule for you to have regular tests and exams.  Things you can do on your own to prevent diseases and keep yourself healthy.  What should I know about diet, weight, and exercise?  Eat a healthy diet    Eat a diet that includes plenty of vegetables, fruits, low-fat dairy products, and lean protein.  Do not eat a lot of foods that are high in solid fats, added sugars, or sodium.  Maintain a healthy weight  Body mass index (BMI) is a measurement that can be used to identify possible weight problems. It estimates body fat based on height and weight. Your  health care provider can help determine your BMI and help you achieve or maintain a healthy weight.  Get regular exercise  Get regular exercise. This is one of the most important things you can do for your health. Most adults should:  Exercise for at least 150 minutes each week. The exercise should increase your heart rate and make you sweat (moderate-intensity exercise).  Do strengthening exercises at least twice a week. This is in addition to the moderate-intensity exercise.  Spend less time sitting. Even light physical activity can be beneficial.  Watch cholesterol and blood lipids  Have your blood tested for lipids and cholesterol at 20 years of age, then have this test every 5 years.  You may need to have your cholesterol levels checked more often if:  Your lipid or cholesterol levels are high.  You are older than 40 years of age.  You are at high risk for heart disease.  What should I know about cancer screening?  Many types of cancers can be detected early and may often be prevented. Depending on your health history and family history, you may need to have cancer screening at various ages. This may include screening for:  Colorectal cancer.  Prostate cancer.  Skin cancer.  Lung cancer.  What should I know about heart disease, diabetes, and high blood pressure?  Blood pressure and heart disease  High blood pressure causes heart disease and increases the risk of stroke. This is more likely to develop in people who have high blood pressure readings or are overweight.  Talk with your health care provider about your target blood pressure readings.  Have your blood pressure checked:  Every 3-5 years if you are 18-39 years of age.  Every year if you are 40 years old or older.  If you are between the ages of 65 and 75 and are a current or former smoker, ask your health care provider if you should have a one-time screening for abdominal aortic aneurysm (AAA).  Diabetes  Have regular diabetes screenings. This checks  your fasting blood sugar level. Have the screening done:  Once every three years after age 45 if you are at a normal weight and have a low risk for diabetes.  More often and at a younger age if you are overweight or have a high risk for diabetes.  What should I know about preventing infection?  Hepatitis B  If you have a higher risk for hepatitis B, you should be screened for this virus. Talk with your health care provider to find out if you are at risk for hepatitis B infection.  Hepatitis C  Blood testing is recommended for:  Everyone born from 1945 through 1965.  Anyone with known risk factors for hepatitis C.  Sexually transmitted infections (STIs)  You should be screened each year for STIs, including gonorrhea and chlamydia, if:  You are sexually active and are younger than 24 years of age.  You are older than 24 years of age and your health care provider tells you that you are at risk for this type of infection.  Your sexual activity has changed since you were last screened, and you are at increased risk for chlamydia or gonorrhea. Ask your health care provider if you are at risk.  Ask your health care provider about whether you are at high risk for HIV. Your health care provider may recommend a prescription medicine to help prevent HIV infection. If you choose to take medicine to prevent HIV, you should first get tested for HIV. You should then be tested every 3 months for as long as you are taking the medicine.  Follow these instructions at home:  Alcohol use  Do not drink alcohol if your health care provider tells you not to drink.  If you drink alcohol:  Limit how much you have to 0-2 drinks a day.  Know how much alcohol is in your drink. In the [place] one drink equals one 12 oz bottle of beer (355 mL), one 5 oz glass of wine (148 mL), or one 1½ oz glass of hard liquor (44 mL).  Lifestyle  Do not use any products that contain nicotine or tobacco. These products include cigarettes, chewing tobacco, and  vaping devices, such as e-cigarettes. If you need help quitting, ask your health care provider.  Do not use street drugs.  Do not share needles.  Ask your health care provider for help if you need support or information about quitting drugs.  General instructions  Schedule regular health, dental, and eye exams.  Stay current with your vaccines.  Tell your health care provider if:  You often feel depressed.  You have ever been abused or do not feel safe at home.  Summary  Adopting a healthy lifestyle and getting preventive care are important in promoting health and wellness.  Follow your health care provider's instructions about healthy diet, exercising, and getting tested or screened for diseases.  Follow your health care provider's instructions on monitoring your cholesterol and blood pressure.  This information is not intended to replace advice given to you by your health care provider. Make sure you discuss any questions you have with your health care provider.  Document Revised: 05/09/2022 Document Reviewed: 05/09/2022  [employer] Patient Education © 2024 [employer]     Counseling/Anticipatory Guidance:   Plan of care reviewed with patient at the conclusion of today's visit. Education was provided in regards to diagnosis, diet and exercise, prostate cancer screening discussed including benefit of early detection, potential need for follow-up, and harms associated with additional management. Patient agrees to screening.    Nutrition, physical activity, healthy weight,ways to reduce stress, adequate sleep, injury prevention, misuse of tobacco, alcohol and drugs, sexual behavior and STD's, dental health, mental health, and immunizations.    Plan of care reviewed with patient at the conclusion of today's visit. Education was provided regarding diagnosis, management and any prescribed or recommended OTC medications.  Patient verbalizes understanding of and agreement with management plan.      Return in about 1 year  (around 8/6/2025) for Annual Physical next visit.    Indiog Lizama, APRN

## 2024-08-06 NOTE — PATIENT INSTRUCTIONS
Health Maintenance, Male  Adopting a healthy lifestyle and getting preventive care are important in promoting health and wellness. Ask your health care provider about:  The right schedule for you to have regular tests and exams.  Things you can do on your own to prevent diseases and keep yourself healthy.  What should I know about diet, weight, and exercise?  Eat a healthy diet    Eat a diet that includes plenty of vegetables, fruits, low-fat dairy products, and lean protein.  Do not eat a lot of foods that are high in solid fats, added sugars, or sodium.  Maintain a healthy weight  Body mass index (BMI) is a measurement that can be used to identify possible weight problems. It estimates body fat based on height and weight. Your health care provider can help determine your BMI and help you achieve or maintain a healthy weight.  Get regular exercise  Get regular exercise. This is one of the most important things you can do for your health. Most adults should:  Exercise for at least 150 minutes each week. The exercise should increase your heart rate and make you sweat (moderate-intensity exercise).  Do strengthening exercises at least twice a week. This is in addition to the moderate-intensity exercise.  Spend less time sitting. Even light physical activity can be beneficial.  Watch cholesterol and blood lipids  Have your blood tested for lipids and cholesterol at 20 years of age, then have this test every 5 years.  You may need to have your cholesterol levels checked more often if:  Your lipid or cholesterol levels are high.  You are older than 40 years of age.  You are at high risk for heart disease.  What should I know about cancer screening?  Many types of cancers can be detected early and may often be prevented. Depending on your health history and family history, you may need to have cancer screening at various ages. This may include screening for:  Colorectal cancer.  Prostate cancer.  Skin cancer.  Lung  cancer.  What should I know about heart disease, diabetes, and high blood pressure?  Blood pressure and heart disease  High blood pressure causes heart disease and increases the risk of stroke. This is more likely to develop in people who have high blood pressure readings or are overweight.  Talk with your health care provider about your target blood pressure readings.  Have your blood pressure checked:  Every 3-5 years if you are 18-39 years of age.  Every year if you are 40 years old or older.  If you are between the ages of 65 and 75 and are a current or former smoker, ask your health care provider if you should have a one-time screening for abdominal aortic aneurysm (AAA).  Diabetes  Have regular diabetes screenings. This checks your fasting blood sugar level. Have the screening done:  Once every three years after age 45 if you are at a normal weight and have a low risk for diabetes.  More often and at a younger age if you are overweight or have a high risk for diabetes.  What should I know about preventing infection?  Hepatitis B  If you have a higher risk for hepatitis B, you should be screened for this virus. Talk with your health care provider to find out if you are at risk for hepatitis B infection.  Hepatitis C  Blood testing is recommended for:  Everyone born from 1945 through 1965.  Anyone with known risk factors for hepatitis C.  Sexually transmitted infections (STIs)  You should be screened each year for STIs, including gonorrhea and chlamydia, if:  You are sexually active and are younger than 24 years of age.  You are older than 24 years of age and your health care provider tells you that you are at risk for this type of infection.  Your sexual activity has changed since you were last screened, and you are at increased risk for chlamydia or gonorrhea. Ask your health care provider if you are at risk.  Ask your health care provider about whether you are at high risk for HIV. Your health care provider  may recommend a prescription medicine to help prevent HIV infection. If you choose to take medicine to prevent HIV, you should first get tested for HIV. You should then be tested every 3 months for as long as you are taking the medicine.  Follow these instructions at home:  Alcohol use  Do not drink alcohol if your health care provider tells you not to drink.  If you drink alcohol:  Limit how much you have to 0-2 drinks a day.  Know how much alcohol is in your drink. In the [place] one drink equals one 12 oz bottle of beer (355 mL), one 5 oz glass of wine (148 mL), or one 1½ oz glass of hard liquor (44 mL).  Lifestyle  Do not use any products that contain nicotine or tobacco. These products include cigarettes, chewing tobacco, and vaping devices, such as e-cigarettes. If you need help quitting, ask your health care provider.  Do not use street drugs.  Do not share needles.  Ask your health care provider for help if you need support or information about quitting drugs.  General instructions  Schedule regular health, dental, and eye exams.  Stay current with your vaccines.  Tell your health care provider if:  You often feel depressed.  You have ever been abused or do not feel safe at home.  Summary  Adopting a healthy lifestyle and getting preventive care are important in promoting health and wellness.  Follow your health care provider's instructions about healthy diet, exercising, and getting tested or screened for diseases.  Follow your health care provider's instructions on monitoring your cholesterol and blood pressure.  This information is not intended to replace advice given to you by your health care provider. Make sure you discuss any questions you have with your health care provider.  Document Revised: 05/09/2022 Document Reviewed: 05/09/2022  [employer] Patient Education © 2024 [employer]

## 2025-08-08 ENCOUNTER — TELEPHONE (OUTPATIENT)
Dept: INTERNAL MEDICINE | Facility: CLINIC | Age: 40
End: 2025-08-08
Payer: COMMERCIAL